# Patient Record
Sex: MALE | URBAN - METROPOLITAN AREA
[De-identification: names, ages, dates, MRNs, and addresses within clinical notes are randomized per-mention and may not be internally consistent; named-entity substitution may affect disease eponyms.]

---

## 2020-09-28 ENCOUNTER — HOSPITAL ENCOUNTER (OUTPATIENT)
Dept: RADIOLOGY | Facility: MEDICAL CENTER | Age: 30
End: 2020-09-28
Attending: PHYSICAL MEDICINE & REHABILITATION
Payer: COMMERCIAL

## 2020-09-28 DIAGNOSIS — M54.50 LOW BACK PAIN, UNSPECIFIED BACK PAIN LATERALITY, UNSPECIFIED CHRONICITY, UNSPECIFIED WHETHER SCIATICA PRESENT: ICD-10-CM

## 2020-09-28 PROCEDURE — 72148 MRI LUMBAR SPINE W/O DYE: CPT

## 2024-09-11 ENCOUNTER — HOSPITAL ENCOUNTER (INPATIENT)
Facility: MEDICAL CENTER | Age: 34
LOS: 2 days | DRG: 193 | End: 2024-09-13
Attending: INTERNAL MEDICINE | Admitting: STUDENT IN AN ORGANIZED HEALTH CARE EDUCATION/TRAINING PROGRAM
Payer: COMMERCIAL

## 2024-09-11 DIAGNOSIS — I27.20 PULMONARY HYPERTENSION (HCC): ICD-10-CM

## 2024-09-11 DIAGNOSIS — J18.9 MULTIFOCAL PNEUMONIA: ICD-10-CM

## 2024-09-11 PROBLEM — R79.89 ELEVATED BRAIN NATRIURETIC PEPTIDE (BNP) LEVEL: Status: ACTIVE | Noted: 2024-09-11

## 2024-09-11 PROBLEM — J96.01 ACUTE HYPOXIC RESPIRATORY FAILURE (HCC): Status: ACTIVE | Noted: 2024-09-11

## 2024-09-11 LAB — TROPONIN T SERPL-MCNC: 14 NG/L (ref 6–19)

## 2024-09-11 PROCEDURE — 84484 ASSAY OF TROPONIN QUANT: CPT

## 2024-09-11 PROCEDURE — 770000 HCHG ROOM/CARE - INTERMEDIATE ICU *

## 2024-09-11 PROCEDURE — 700111 HCHG RX REV CODE 636 W/ 250 OVERRIDE (IP): Mod: JZ | Performed by: STUDENT IN AN ORGANIZED HEALTH CARE EDUCATION/TRAINING PROGRAM

## 2024-09-11 PROCEDURE — 87449 NOS EACH ORGANISM AG IA: CPT

## 2024-09-11 PROCEDURE — 99223 1ST HOSP IP/OBS HIGH 75: CPT | Performed by: STUDENT IN AN ORGANIZED HEALTH CARE EDUCATION/TRAINING PROGRAM

## 2024-09-11 PROCEDURE — 87899 AGENT NOS ASSAY W/OPTIC: CPT

## 2024-09-11 RX ORDER — MORPHINE SULFATE 4 MG/ML
2 INJECTION INTRAVENOUS
Status: DISCONTINUED | OUTPATIENT
Start: 2024-09-11 | End: 2024-09-13 | Stop reason: HOSPADM

## 2024-09-11 RX ORDER — PROMETHAZINE HYDROCHLORIDE 12.5 MG/1
12.5-25 SUPPOSITORY RECTAL EVERY 4 HOURS PRN
Status: DISCONTINUED | OUTPATIENT
Start: 2024-09-11 | End: 2024-09-13 | Stop reason: HOSPADM

## 2024-09-11 RX ORDER — ENOXAPARIN SODIUM 100 MG/ML
40 INJECTION SUBCUTANEOUS DAILY
Status: DISCONTINUED | OUTPATIENT
Start: 2024-09-12 | End: 2024-09-12

## 2024-09-11 RX ORDER — ONDANSETRON 4 MG/1
4 TABLET, ORALLY DISINTEGRATING ORAL EVERY 4 HOURS PRN
Status: DISCONTINUED | OUTPATIENT
Start: 2024-09-11 | End: 2024-09-13 | Stop reason: HOSPADM

## 2024-09-11 RX ORDER — PROMETHAZINE HYDROCHLORIDE 25 MG/1
12.5-25 TABLET ORAL EVERY 4 HOURS PRN
Status: DISCONTINUED | OUTPATIENT
Start: 2024-09-11 | End: 2024-09-13 | Stop reason: HOSPADM

## 2024-09-11 RX ORDER — FUROSEMIDE 10 MG/ML
20 INJECTION INTRAMUSCULAR; INTRAVENOUS
Status: DISCONTINUED | OUTPATIENT
Start: 2024-09-11 | End: 2024-09-13 | Stop reason: HOSPADM

## 2024-09-11 RX ORDER — ONDANSETRON 2 MG/ML
4 INJECTION INTRAMUSCULAR; INTRAVENOUS EVERY 4 HOURS PRN
Status: DISCONTINUED | OUTPATIENT
Start: 2024-09-11 | End: 2024-09-13 | Stop reason: HOSPADM

## 2024-09-11 RX ORDER — AZITHROMYCIN 500 MG/5ML
500 INJECTION, POWDER, LYOPHILIZED, FOR SOLUTION INTRAVENOUS EVERY 24 HOURS
Status: DISCONTINUED | OUTPATIENT
Start: 2024-09-12 | End: 2024-09-12

## 2024-09-11 RX ORDER — OXYCODONE HYDROCHLORIDE 5 MG/1
2.5 TABLET ORAL
Status: DISCONTINUED | OUTPATIENT
Start: 2024-09-11 | End: 2024-09-13 | Stop reason: HOSPADM

## 2024-09-11 RX ORDER — IPRATROPIUM BROMIDE AND ALBUTEROL SULFATE 2.5; .5 MG/3ML; MG/3ML
3 SOLUTION RESPIRATORY (INHALATION)
Status: DISCONTINUED | OUTPATIENT
Start: 2024-09-11 | End: 2024-09-13 | Stop reason: HOSPADM

## 2024-09-11 RX ORDER — OXYCODONE HYDROCHLORIDE 5 MG/1
5 TABLET ORAL
Status: DISCONTINUED | OUTPATIENT
Start: 2024-09-11 | End: 2024-09-13 | Stop reason: HOSPADM

## 2024-09-11 RX ORDER — PROCHLORPERAZINE EDISYLATE 5 MG/ML
5-10 INJECTION INTRAMUSCULAR; INTRAVENOUS EVERY 4 HOURS PRN
Status: DISCONTINUED | OUTPATIENT
Start: 2024-09-11 | End: 2024-09-13 | Stop reason: HOSPADM

## 2024-09-11 RX ORDER — FUROSEMIDE 10 MG/ML
40 INJECTION INTRAMUSCULAR; INTRAVENOUS
Status: DISCONTINUED | OUTPATIENT
Start: 2024-09-11 | End: 2024-09-11

## 2024-09-11 RX ORDER — FUROSEMIDE 10 MG/ML
20 INJECTION INTRAMUSCULAR; INTRAVENOUS
Status: DISCONTINUED | OUTPATIENT
Start: 2024-09-11 | End: 2024-09-11

## 2024-09-11 RX ADMIN — FUROSEMIDE 20 MG: 10 INJECTION, SOLUTION INTRAVENOUS at 20:02

## 2024-09-11 ASSESSMENT — PATIENT HEALTH QUESTIONNAIRE - PHQ9
2. FEELING DOWN, DEPRESSED, IRRITABLE, OR HOPELESS: NOT AT ALL
1. LITTLE INTEREST OR PLEASURE IN DOING THINGS: NOT AT ALL
SUM OF ALL RESPONSES TO PHQ9 QUESTIONS 1 AND 2: 0

## 2024-09-11 ASSESSMENT — COGNITIVE AND FUNCTIONAL STATUS - GENERAL
SUGGESTED CMS G CODE MODIFIER DAILY ACTIVITY: CH
DAILY ACTIVITIY SCORE: 24
SUGGESTED CMS G CODE MODIFIER MOBILITY: CH
MOBILITY SCORE: 24

## 2024-09-11 ASSESSMENT — ENCOUNTER SYMPTOMS
COUGH: 1
CHILLS: 1
VOMITING: 1
FEVER: 1
SHORTNESS OF BREATH: 1
NAUSEA: 1
SPUTUM PRODUCTION: 1

## 2024-09-11 ASSESSMENT — PAIN DESCRIPTION - PAIN TYPE
TYPE: ACUTE PAIN
TYPE: ACUTE PAIN

## 2024-09-11 NOTE — PROGRESS NOTES
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  Sending physician reports that approximately 1 week ago he began to have nausea, vomiting, cough and dyspnea.  He was found to be saturating at 40% on room air by EMS and was brought to the ER.  In the ER he was placed on a nonrebreather and now has saturations in the low 90s.  A respiratory viral panel was obtained which was negative, he has a mild leukocytosis on CBC otherwise is not profoundly abnormal.  Other labs include a CRP of 2.4, troponin 39, .  CT showed multifocal pneumonia.  Given the patient's extremis due to transfer to [facility].  Reviewed occupational exposures with sending physician and he does not believe patient has had any exposure to vector borne illnesses or chemicals.  He is unaware of any mice droppings near his campsite.  Pertinent imaging & lab results: As above  Code Status: Full per transferring provider, I personally verified with the transferring provider patient's code status and the transferring provider has confirmed this with the patient.  Further work up or recommendations prior to transfer: None  Consultants called prior to transfer and pertinent input from consultants: None  Patient accepted for transfer: Yes  Consultants to be called upon arrival: Hospitalist  Floor requested: IMCU  ADT order placed for accepted patient: Yes    Please inform the Intensivist upon assignment of an ICU bed and then again on arrival of the patient.

## 2024-09-12 PROBLEM — J96.01 ACUTE RESPIRATORY FAILURE WITH HYPOXEMIA (HCC): Status: ACTIVE | Noted: 2024-09-12

## 2024-09-12 LAB
ALBUMIN SERPL BCP-MCNC: 3.3 G/DL (ref 3.2–4.9)
ALBUMIN/GLOB SERPL: 1.3 G/DL
ALP SERPL-CCNC: 72 U/L (ref 30–99)
ALT SERPL-CCNC: 64 U/L (ref 2–50)
ANION GAP SERPL CALC-SCNC: 10 MMOL/L (ref 7–16)
AST SERPL-CCNC: 33 U/L (ref 12–45)
BASOPHILS # BLD AUTO: 0.4 % (ref 0–1.8)
BASOPHILS # BLD: 0.05 K/UL (ref 0–0.12)
BILIRUB SERPL-MCNC: 1 MG/DL (ref 0.1–1.5)
BUN SERPL-MCNC: 20 MG/DL (ref 8–22)
CALCIUM ALBUM COR SERPL-MCNC: 8.6 MG/DL (ref 8.5–10.5)
CALCIUM SERPL-MCNC: 8 MG/DL (ref 8.5–10.5)
CHLORIDE SERPL-SCNC: 106 MMOL/L (ref 96–112)
CO2 SERPL-SCNC: 25 MMOL/L (ref 20–33)
CREAT SERPL-MCNC: 1.35 MG/DL (ref 0.5–1.4)
EOSINOPHIL # BLD AUTO: 0.05 K/UL (ref 0–0.51)
EOSINOPHIL NFR BLD: 0.4 % (ref 0–6.9)
ERYTHROCYTE [DISTWIDTH] IN BLOOD BY AUTOMATED COUNT: 41.8 FL (ref 35.9–50)
GFR SERPLBLD CREATININE-BSD FMLA CKD-EPI: 71 ML/MIN/1.73 M 2
GLOBULIN SER CALC-MCNC: 2.5 G/DL (ref 1.9–3.5)
GLUCOSE SERPL-MCNC: 128 MG/DL (ref 65–99)
HCT VFR BLD AUTO: 44.3 % (ref 42–52)
HGB BLD-MCNC: 14.7 G/DL (ref 14–18)
IMM GRANULOCYTES # BLD AUTO: 0.04 K/UL (ref 0–0.11)
IMM GRANULOCYTES NFR BLD AUTO: 0.3 % (ref 0–0.9)
LYMPHOCYTES # BLD AUTO: 1.88 K/UL (ref 1–4.8)
LYMPHOCYTES NFR BLD: 15 % (ref 22–41)
MAGNESIUM SERPL-MCNC: 2 MG/DL (ref 1.5–2.5)
MCH RBC QN AUTO: 30.2 PG (ref 27–33)
MCHC RBC AUTO-ENTMCNC: 33.2 G/DL (ref 32.3–36.5)
MCV RBC AUTO: 91 FL (ref 81.4–97.8)
MONOCYTES # BLD AUTO: 1.27 K/UL (ref 0–0.85)
MONOCYTES NFR BLD AUTO: 10.1 % (ref 0–13.4)
NEUTROPHILS # BLD AUTO: 9.25 K/UL (ref 1.82–7.42)
NEUTROPHILS NFR BLD: 73.8 % (ref 44–72)
NRBC # BLD AUTO: 0 K/UL
NRBC BLD-RTO: 0 /100 WBC (ref 0–0.2)
PLATELET # BLD AUTO: 223 K/UL (ref 164–446)
PMV BLD AUTO: 10.1 FL (ref 9–12.9)
POTASSIUM SERPL-SCNC: 4.3 MMOL/L (ref 3.6–5.5)
PROT SERPL-MCNC: 5.8 G/DL (ref 6–8.2)
RBC # BLD AUTO: 4.87 M/UL (ref 4.7–6.1)
SODIUM SERPL-SCNC: 141 MMOL/L (ref 135–145)
WBC # BLD AUTO: 12.5 K/UL (ref 4.8–10.8)

## 2024-09-12 PROCEDURE — 80053 COMPREHEN METABOLIC PANEL: CPT

## 2024-09-12 PROCEDURE — 83735 ASSAY OF MAGNESIUM: CPT

## 2024-09-12 PROCEDURE — 770006 HCHG ROOM/CARE - MED/SURG/GYN SEMI*

## 2024-09-12 PROCEDURE — 700111 HCHG RX REV CODE 636 W/ 250 OVERRIDE (IP): Performed by: STUDENT IN AN ORGANIZED HEALTH CARE EDUCATION/TRAINING PROGRAM

## 2024-09-12 PROCEDURE — 87040 BLOOD CULTURE FOR BACTERIA: CPT | Mod: 91

## 2024-09-12 PROCEDURE — 87077 CULTURE AEROBIC IDENTIFY: CPT

## 2024-09-12 PROCEDURE — 36415 COLL VENOUS BLD VENIPUNCTURE: CPT

## 2024-09-12 PROCEDURE — 85025 COMPLETE CBC W/AUTO DIFF WBC: CPT

## 2024-09-12 PROCEDURE — 99233 SBSQ HOSP IP/OBS HIGH 50: CPT | Performed by: HOSPITALIST

## 2024-09-12 PROCEDURE — 87150 DNA/RNA AMPLIFIED PROBE: CPT

## 2024-09-12 PROCEDURE — 700102 HCHG RX REV CODE 250 W/ 637 OVERRIDE(OP): Performed by: HOSPITALIST

## 2024-09-12 PROCEDURE — A9270 NON-COVERED ITEM OR SERVICE: HCPCS | Performed by: HOSPITALIST

## 2024-09-12 RX ORDER — AZITHROMYCIN 250 MG/1
500 TABLET, FILM COATED ORAL DAILY
Status: COMPLETED | OUTPATIENT
Start: 2024-09-12 | End: 2024-09-13

## 2024-09-12 RX ADMIN — FUROSEMIDE 20 MG: 10 INJECTION, SOLUTION INTRAVENOUS at 16:23

## 2024-09-12 RX ADMIN — FUROSEMIDE 20 MG: 10 INJECTION, SOLUTION INTRAVENOUS at 05:58

## 2024-09-12 RX ADMIN — AMOXICILLIN AND CLAVULANATE POTASSIUM 1 TABLET: 875; 125 TABLET, FILM COATED ORAL at 11:30

## 2024-09-12 RX ADMIN — AZITHROMYCIN DIHYDRATE 500 MG: 250 TABLET ORAL at 09:37

## 2024-09-12 RX ADMIN — AMOXICILLIN AND CLAVULANATE POTASSIUM 1 TABLET: 875; 125 TABLET, FILM COATED ORAL at 16:23

## 2024-09-12 ASSESSMENT — ENCOUNTER SYMPTOMS
VOMITING: 0
DIARRHEA: 0
COUGH: 0
SHORTNESS OF BREATH: 1
HEADACHES: 0
SORE THROAT: 0
NAUSEA: 0
FEVER: 0
ABDOMINAL PAIN: 0
NERVOUS/ANXIOUS: 0
DIZZINESS: 0

## 2024-09-12 ASSESSMENT — FIBROSIS 4 INDEX: FIB4 SCORE: 0.61

## 2024-09-12 ASSESSMENT — PAIN DESCRIPTION - PAIN TYPE
TYPE: ACUTE PAIN

## 2024-09-12 NOTE — PROGRESS NOTES
Pharmacy Medication Reconciliation      ~Medication reconciliation updated and complete per patient at bedside  ~Allergies have been verified and updated   ~No oral ABX within the last 30 days  ~Patient home pharmacy :  [facility] [phone]      ~Anticoagulants (rivaroxaban, apixaban, edoxaban, dabigatran, warfarin, enoxaparin) taken in the last 14 days? No  ~

## 2024-09-12 NOTE — PROGRESS NOTES
Patient arrived to [facility] rm T605 via [facility].  Pt is A&O x4, 98% on 10L NRB, SR in 60s. No c/o pain.  Pain able to stand and pivot, denies dizziness/lightheadedness.  Skin intact.  Oriented patient to the room, safety precautions in place.  No voiced needs at this time.

## 2024-09-12 NOTE — ASSESSMENT & PLAN NOTE
Normal procalcitonin  Viral PCR, covid negative  Elevated wbc but question leukemoid reaction  Changing antibiotics to augmentin and stop ceftriaxone  Encourage deep breathing  Titrating oxygen to keep SpO2 >90  Suspicious for aspiration pneumonia  Elevated BNP >800 and checking echocardiogram. Negative troponin  Mobilize  CTA chest negative for PE and showed mostly right sided opacifications with some on left  RT per protocol  High altitude sickness as to etiology of N/V??  He has been used to higher altitudes in the past but had just competed in [place] for a fitness event.

## 2024-09-12 NOTE — CARE PLAN
The patient is Watcher - Medium risk of patient condition declining or worsening    Shift Goals  Clinical Goals: Titrae oxygen, Pulmonology consult  Patient Goals: Go home soon  Family Goals: updates    Progress made toward(s) clinical / shift goals:      Problem: Knowledge Deficit - Standard  Goal: Patient and family/care givers will demonstrate understanding of plan of care, disease process/condition, diagnostic tests and medications  Outcome: Progressing     Problem: Pain - Standard  Goal: Alleviation of pain or a reduction in pain to the patient’s comfort goal  Outcome: Progressing     Problem: Psychosocial  Goal: Patient's level of anxiety will decrease  Outcome: Progressing     Problem: Hemodynamics  Goal: Patient's hemodynamics, fluid balance and neurologic status will be stable or improve  Outcome: Progressing     Problem: Respiratory  Goal: Patient will achieve/maintain optimum respiratory ventilation and gas exchange  Outcome: Progressing

## 2024-09-12 NOTE — CARE PLAN
The patient is Stable - Low risk of patient condition declining or worsening    Shift Goals  Clinical Goals: wean off oxygen, ECHO  Patient Goals: go home  Family Goals: updates    Progress made toward(s) clinical / shift goals:    Problem: Knowledge Deficit - Standard  Goal: Patient and family/care givers will demonstrate understanding of plan of care, disease process/condition, diagnostic tests and medications  Outcome: Progressing     Problem: Respiratory  Goal: Patient will achieve/maintain optimum respiratory ventilation and gas exchange  Outcome: Progressing     Pt was transferred from [facility] to [place] [address] today. PT was weaned from 6 L N/C to 1 L N/C with education on incentive spirometer.     Patient is not progressing towards the following goals:

## 2024-09-12 NOTE — H&P
Hospital Medicine History & Physical Note    Date of Service  9/11/2024    Primary Care Physician  Pcp Pt States None    Consultants  None     Code Status  Full Code    Chief Complaint  Respiratory distress       History of Presenting Illness  Jamin Fagan is a 34 y.o. male who presented 9/11/2024 with nausea, vomiting and shortness of breath.  Patient was visiting [place] performed a CrossFit competition from Wednesday to over the weekend then subsequently went in the mountains to cut wood for ski slopes.  He started feeling unwell few days he was there slept it off however this morning woke up persistently nauseous and vomiting when he laid down.  Reports having headaches, fevers, chills.  Denies any insect bite that he is aware of.  Denies any sick contacts.  At outside facility, CT of the chest was negative for PE but did showed multifocal pneumonia.  He was given dose of ceftriaxone and Zithromax.    I discussed the plan of care with patient and bedside RN.    Review of Systems  Review of Systems   Constitutional:  Positive for chills, fever and malaise/fatigue.   Respiratory:  Positive for cough, sputum production and shortness of breath.    Gastrointestinal:  Positive for nausea and vomiting.       Past Medical History   has no past medical history on file.    Surgical History   has no past surgical history on file.     Family History  family history is not on file.   Family history reviewed with patient. There is no family history that is pertinent to the chief complaint.     Social History   reports that he has never smoked. He has never used smokeless tobacco. He reports current alcohol use of about 3.0 oz of alcohol per week.    Allergies  No Known Allergies    Medications  None       Physical Exam                             Physical Exam  Vitals and nursing note reviewed.   Constitutional:       Appearance: Normal appearance.   HENT:      Head: Normocephalic and atraumatic.      Right Ear:  Tympanic membrane normal.      Left Ear: Tympanic membrane normal.      Nose: Nose normal.      Mouth/Throat:      Mouth: Mucous membranes are moist.      Pharynx: Oropharynx is clear.   Eyes:      Extraocular Movements: Extraocular movements intact.      Pupils: Pupils are equal, round, and reactive to light.   Cardiovascular:      Rate and Rhythm: Normal rate and regular rhythm.      Pulses: Normal pulses.      Heart sounds: Normal heart sounds.   Pulmonary:      Breath sounds: Rhonchi and rales present.      Comments: Tachypnea  Diminished air movement bilaterally   Abdominal:      General: Bowel sounds are normal. There is no distension.      Palpations: Abdomen is soft. There is no mass.   Musculoskeletal:         General: Normal range of motion.      Cervical back: Neck supple.   Skin:     General: Skin is warm.      Capillary Refill: Capillary refill takes less than 2 seconds.   Neurological:      General: No focal deficit present.      Mental Status: He is alert and oriented to person, place, and time. Mental status is at baseline.   Psychiatric:         Mood and Affect: Mood normal.         Behavior: Behavior normal.         Laboratory:        WBC 14.5  Hemoglobin 15.7  Platelets 266  Sodium 142  Potassium 4.2  Glucose 128  BUN 25  Creatinine 1.3  AST 52  Total bili 1.1    Lactic acid 1.7  Troponin high at 39    Respiratory panel negative for coronavirus, SARS COVID, influenza, parainfluenza, RSV, Bordetella, chlamydia, mycoplasma  CRP 2.4  Procalcitonin less than 0.05 ESR 4  Prothrombin 11.2  PTT 22.1    Imaging:  OUTSIDE IMAGES-CT ABDOMEN /PELVIS   Final Result      OUTSIDE IMAGES-CT CHEST    (Results Pending)       X-Ray:  I have personally reviewed the images and compared with prior images.    Assessment/Plan:  Justification for Admission Status  I anticipate this patient will require at least two midnights for appropriate medical management, necessitating inpatient admission because acute  hypoxic respiratory failure secondary to multifocal pneumonia versus ARDS    Patient will need a Intermediate Care (Adult and Pediatrics) bed on MEDICAL service .  The need is secondary to see above.    * Acute hypoxic respiratory failure (HCC)  Assessment & Plan  ?ARDS vs Multi focal PNA  Continue with ceftriaxone and Zithromax  Follow-up blood, respiratory, strep pneumo urine antigen and Legionella urine antigens  Viral panel expanded negative at outside facility  ?  Diuresis  Echocardiogram  Pulmonary consultation in a.m.  Bronchodilators as needed  Holding steroids possible fungal in nature as per intensivist discussion with outside provider    Multifocal pneumonia  Assessment & Plan  Continue with ceftriaxone and Zithromax  Follow-up blood, respiratory, strep pneumo urine antigen and Legionella urine antigens  Viral panel expanded negative at outside facility    Elevated brain natriuretic peptide (BNP) level  Assessment & Plan  Echocardiogram        VTE prophylaxis: SCDs/TEDs

## 2024-09-12 NOTE — ASSESSMENT & PLAN NOTE
Change ceftriaxone and Zithromax to oral Augmentin 9/11/24  Follow-up blood, respiratory, strep pneumo urine antigen and Legionella urine antigens  Viral panel expanded negative at outside facility as was procalcitonin

## 2024-09-12 NOTE — PROGRESS NOTES
Hospital Medicine Daily Progress Note    Date of Service  9/12/2024    Chief Complaint  Respiratory distress    Hospital Course  Jamin Fagan is a quite active/fit 34 y.o. male admitted 9/11/2024 with nausea vomiting shortness of breath.  Who had been camping and working at [place] the [employer] recently. He denies rodent dropping exposure. He states feeling nauseated, headache, and vomiting. He believes it is possible he aspirated some emesis. He went to an outside facility and there is concerns of respiratory distress of unclear etiology and he was transferred to [facility] for further evaluation.  Imaging at outside facility was concerning for multifocal pneumonia versus ARDS.  Patient had been started on ceftriaxone and azithromycin.  Reported expanded viral panel at outside facility was unremarkable as was COVID. Outpatient labs showed normal procalcitonin, negative viral PCR.  He had a BNP ~850.      Interval Problem Update  9/12 WBC 12.5, on 2L O2 via NC.  No significant cough, no fever.  Reviewed CT chest and abd with him.  Transfer out of ICU.  Ambulate.  Patient did states aspirating with vomiting prior to hospitalization.  Feeling better.  Await echocardiogram.    I have discussed this patient's plan of care and discharge plan at IDT rounds today with Case Management, Nursing, Nursing leadership, and other members of the IDT team.    Consultants/Specialty  none    Code Status  Full Code    Disposition  The patient is not medically cleared for discharge to home or a post-acute facility.  Anticipate discharge to: home with close outpatient follow-up    I have placed the appropriate orders for post-discharge needs.    Review of Systems  Review of Systems   Constitutional:  Negative for fever and malaise/fatigue.   HENT:  Negative for congestion and sore throat.    Respiratory:  Positive for shortness of breath. Negative for cough.    Cardiovascular:  Negative for chest pain.    Gastrointestinal:  Negative for abdominal pain, diarrhea, nausea and vomiting.   Neurological:  Negative for dizziness and headaches.   Psychiatric/Behavioral:  The patient is not nervous/anxious.         Physical Exam  Temp:  [36.8 °C (98.2 °F)-37.4 °C (99.4 °F)] 36.8 °C (98.2 °F)  Pulse:  [46-71] 60  Resp:  [22-53] 53  BP: (108-167)/(57-84) 116/71  SpO2:  [91 %-98 %] 91 %    Physical Exam  Vitals reviewed.   Constitutional:       Appearance: He is not ill-appearing.   HENT:      Head: Normocephalic.      Mouth/Throat:      Mouth: Mucous membranes are moist.   Eyes:      Extraocular Movements: Extraocular movements intact.      Conjunctiva/sclera: Conjunctivae normal.   Cardiovascular:      Rate and Rhythm: Normal rate and regular rhythm.      Heart sounds: No murmur heard.  Pulmonary:      Effort: Pulmonary effort is normal.      Comments: Right lower base crackles  Abdominal:      General: Bowel sounds are normal. There is no distension.   Musculoskeletal:      Cervical back: Neck supple. No tenderness.      Right lower leg: No edema.      Left lower leg: No edema.   Lymphadenopathy:      Cervical: No cervical adenopathy.   Neurological:      General: No focal deficit present.      Mental Status: He is alert and oriented to person, place, and time.      Cranial Nerves: No cranial nerve deficit.   Psychiatric:         Mood and Affect: Mood normal.         Behavior: Behavior normal.         Thought Content: Thought content normal.         Fluids    Intake/Output Summary (Last 24 hours) at 9/12/2024 1154  Last data filed at 9/12/2024 1000  Gross per 24 hour   Intake 650 ml   Output 2800 ml   Net -2150 ml        Laboratory  Recent Labs     09/12/24  0500   WBC 12.5*   RBC 4.87   HEMOGLOBIN 14.7   HEMATOCRIT 44.3   MCV 91.0   MCH 30.2   MCHC 33.2   RDW 41.8   PLATELETCT 223   MPV 10.1     Recent Labs     09/12/24  0500   SODIUM 141   POTASSIUM 4.3   CHLORIDE 106   CO2 25   GLUCOSE 128*   BUN 20   CREATININE 1.35    CALCIUM 8.0*                   Imaging  OUTSIDE IMAGES-CT ABDOMEN /PELVIS   Final Result      OUTSIDE IMAGES-CT CHEST    (Results Pending)   EC-ECHOCARDIOGRAM COMPLETE W/O CONT    (Results Pending)        Assessment/Plan  Acute respiratory failure with hypoxemia (HCC)- (present on admission)  Assessment & Plan  Normal procalcitonin  Viral PCR, covid negative  Elevated wbc but question leukemoid reaction  Changing antibiotics to augmentin and stop ceftriaxone  Encourage deep breathing  Titrating oxygen to keep SpO2 >90  Suspicious for aspiration pneumonia  Elevated BNP >800 and checking echocardiogram. Negative troponin  Mobilize  CTA chest negative for PE and showed mostly right sided opacifications with some on left  RT per protocol  High altitude sickness as to etiology of N/V??  He has been used to higher altitudes in the past but had just competed in [place] at [employer] [place] for a fitness event.        Elevated brain natriuretic peptide (BNP) level  Assessment & Plan  Echocardiogram pending  No acute sign of volume overload    Multifocal pneumonia  Assessment & Plan  Change ceftriaxone and Zithromax to oral Augmentin 9/11/24  Follow-up blood, respiratory, strep pneumo urine antigen and Legionella urine antigens  Viral panel expanded negative at outside facility as was procalcitonin    Acute hypoxic respiratory failure (HCC)  Assessment & Plan             VTE prophylaxis:   SCDs/TEDs   enoxaparin ppx      I have performed a physical exam and reviewed and updated ROS and Plan today (9/12/2024). In review of yesterday's note (9/11/2024), there are no changes except as documented above.

## 2024-09-12 NOTE — PROGRESS NOTES
4 Eyes Skin Assessment Completed by KEIRA Zimmerman and KEIRA Vidal.    Head WDL  Ears WDL  Nose WDL  Mouth WDL  Neck WDL  Breast/Chest WDL  Shoulder Blades WDL  Spine WDL  (R) Arm/Elbow/Hand WDL  (L) Arm/Elbow/Hand WDL  Abdomen WDL  Groin WDL  Scrotum/Coccyx/Buttocks WDL  (R) Leg Scab  (L) Leg Scab  (R) Heel/Foot/Toe WDL  (L) Heel/Foot/Toe WDL          Devices In Places Nasal Canula       Interventions In Place Pillows, Grey foam on N/C tubing     Possible Skin Injury No    Pictures Uploaded Into Epic N/A  Wound Consult Placed N/A  RN Wound Prevention Protocol Ordered No

## 2024-09-13 ENCOUNTER — APPOINTMENT (OUTPATIENT)
Dept: CARDIOLOGY | Facility: MEDICAL CENTER | Age: 34
DRG: 193 | End: 2024-09-13
Attending: INTERNAL MEDICINE
Payer: COMMERCIAL

## 2024-09-13 ENCOUNTER — PHARMACY VISIT (OUTPATIENT)
Dept: PHARMACY | Facility: MEDICAL CENTER | Age: 34
End: 2024-09-13
Payer: COMMERCIAL

## 2024-09-13 VITALS
HEIGHT: 69 IN | RESPIRATION RATE: 17 BRPM | OXYGEN SATURATION: 95 % | WEIGHT: 184.53 LBS | SYSTOLIC BLOOD PRESSURE: 134 MMHG | DIASTOLIC BLOOD PRESSURE: 58 MMHG | TEMPERATURE: 98.4 F | BODY MASS INDEX: 27.33 KG/M2 | HEART RATE: 63 BPM

## 2024-09-13 LAB
ALBUMIN SERPL BCP-MCNC: 3.4 G/DL (ref 3.2–4.9)
ALBUMIN/GLOB SERPL: 1.2 G/DL
ALP SERPL-CCNC: 70 U/L (ref 30–99)
ALT SERPL-CCNC: 54 U/L (ref 2–50)
AMPHET UR QL SCN: NEGATIVE
ANION GAP SERPL CALC-SCNC: 10 MMOL/L (ref 7–16)
AST SERPL-CCNC: 26 U/L (ref 12–45)
BARBITURATES UR QL SCN: NEGATIVE
BASOPHILS # BLD AUTO: 0.5 % (ref 0–1.8)
BASOPHILS # BLD: 0.05 K/UL (ref 0–0.12)
BENZODIAZ UR QL SCN: NEGATIVE
BILIRUB SERPL-MCNC: 0.7 MG/DL (ref 0.1–1.5)
BUN SERPL-MCNC: 22 MG/DL (ref 8–22)
BZE UR QL SCN: NEGATIVE
CALCIUM ALBUM COR SERPL-MCNC: 9.2 MG/DL (ref 8.5–10.5)
CALCIUM SERPL-MCNC: 8.7 MG/DL (ref 8.5–10.5)
CANNABINOIDS UR QL SCN: NEGATIVE
CHLORIDE SERPL-SCNC: 103 MMOL/L (ref 96–112)
CO2 SERPL-SCNC: 26 MMOL/L (ref 20–33)
CREAT SERPL-MCNC: 1.29 MG/DL (ref 0.5–1.4)
EOSINOPHIL # BLD AUTO: 0.18 K/UL (ref 0–0.51)
EOSINOPHIL NFR BLD: 1.8 % (ref 0–6.9)
ERYTHROCYTE [DISTWIDTH] IN BLOOD BY AUTOMATED COUNT: 41.1 FL (ref 35.9–50)
FENTANYL UR QL: NEGATIVE
GFR SERPLBLD CREATININE-BSD FMLA CKD-EPI: 75 ML/MIN/1.73 M 2
GLOBULIN SER CALC-MCNC: 2.8 G/DL (ref 1.9–3.5)
GLUCOSE SERPL-MCNC: 108 MG/DL (ref 65–99)
HAV IGM SERPL QL IA: NORMAL
HBV CORE IGM SER QL: NORMAL
HBV SURFACE AG SER QL: NORMAL
HCT VFR BLD AUTO: 44.9 % (ref 42–52)
HCV AB SER QL: NORMAL
HGB BLD-MCNC: 15.3 G/DL (ref 14–18)
HIV 1+2 AB+HIV1 P24 AG SERPL QL IA: NORMAL
IMM GRANULOCYTES # BLD AUTO: 0.03 K/UL (ref 0–0.11)
IMM GRANULOCYTES NFR BLD AUTO: 0.3 % (ref 0–0.9)
L PNEUMO AG UR QL IA: NEGATIVE
LV EJECT FRACT  99904: 64
LV EJECT FRACT MOD 2C 99903: 63.73
LV EJECT FRACT MOD 4C 99902: 66.69
LV EJECT FRACT MOD BP 99901: 64.5
LYMPHOCYTES # BLD AUTO: 2.32 K/UL (ref 1–4.8)
LYMPHOCYTES NFR BLD: 23.7 % (ref 22–41)
MAGNESIUM SERPL-MCNC: 2.1 MG/DL (ref 1.5–2.5)
MCH RBC QN AUTO: 30.9 PG (ref 27–33)
MCHC RBC AUTO-ENTMCNC: 34.1 G/DL (ref 32.3–36.5)
MCV RBC AUTO: 90.7 FL (ref 81.4–97.8)
METHADONE UR QL SCN: NEGATIVE
MONOCYTES # BLD AUTO: 1.04 K/UL (ref 0–0.85)
MONOCYTES NFR BLD AUTO: 10.6 % (ref 0–13.4)
NEUTROPHILS # BLD AUTO: 6.16 K/UL (ref 1.82–7.42)
NEUTROPHILS NFR BLD: 63.1 % (ref 44–72)
NRBC # BLD AUTO: 0 K/UL
NRBC BLD-RTO: 0 /100 WBC (ref 0–0.2)
OPIATES UR QL SCN: NEGATIVE
OXYCODONE UR QL SCN: NEGATIVE
PCP UR QL SCN: NEGATIVE
PLATELET # BLD AUTO: 251 K/UL (ref 164–446)
PMV BLD AUTO: 10.5 FL (ref 9–12.9)
POTASSIUM SERPL-SCNC: 4 MMOL/L (ref 3.6–5.5)
PROPOXYPH UR QL SCN: NEGATIVE
PROT SERPL-MCNC: 6.2 G/DL (ref 6–8.2)
RBC # BLD AUTO: 4.95 M/UL (ref 4.7–6.1)
S PNEUM AG UR QL: NEGATIVE
SODIUM SERPL-SCNC: 139 MMOL/L (ref 135–145)
WBC # BLD AUTO: 9.8 K/UL (ref 4.8–10.8)

## 2024-09-13 PROCEDURE — 700102 HCHG RX REV CODE 250 W/ 637 OVERRIDE(OP): Performed by: HOSPITALIST

## 2024-09-13 PROCEDURE — 87389 HIV-1 AG W/HIV-1&-2 AB AG IA: CPT

## 2024-09-13 PROCEDURE — 93306 TTE W/DOPPLER COMPLETE: CPT | Mod: 26 | Performed by: INTERNAL MEDICINE

## 2024-09-13 PROCEDURE — A9270 NON-COVERED ITEM OR SERVICE: HCPCS | Performed by: HOSPITALIST

## 2024-09-13 PROCEDURE — RXMED WILLOW AMBULATORY MEDICATION CHARGE: Performed by: INTERNAL MEDICINE

## 2024-09-13 PROCEDURE — 93306 TTE W/DOPPLER COMPLETE: CPT

## 2024-09-13 PROCEDURE — 83735 ASSAY OF MAGNESIUM: CPT

## 2024-09-13 PROCEDURE — 80307 DRUG TEST PRSMV CHEM ANLYZR: CPT

## 2024-09-13 PROCEDURE — 700111 HCHG RX REV CODE 636 W/ 250 OVERRIDE (IP): Performed by: STUDENT IN AN ORGANIZED HEALTH CARE EDUCATION/TRAINING PROGRAM

## 2024-09-13 PROCEDURE — 80053 COMPREHEN METABOLIC PANEL: CPT

## 2024-09-13 PROCEDURE — 85025 COMPLETE CBC W/AUTO DIFF WBC: CPT

## 2024-09-13 PROCEDURE — 80074 ACUTE HEPATITIS PANEL: CPT

## 2024-09-13 PROCEDURE — 99239 HOSP IP/OBS DSCHRG MGMT >30: CPT | Performed by: INTERNAL MEDICINE

## 2024-09-13 RX ADMIN — AMOXICILLIN AND CLAVULANATE POTASSIUM 1 TABLET: 875; 125 TABLET, FILM COATED ORAL at 04:18

## 2024-09-13 RX ADMIN — AMOXICILLIN AND CLAVULANATE POTASSIUM 1 TABLET: 875; 125 TABLET, FILM COATED ORAL at 17:31

## 2024-09-13 RX ADMIN — AZITHROMYCIN DIHYDRATE 500 MG: 250 TABLET ORAL at 04:18

## 2024-09-13 RX ADMIN — FUROSEMIDE 20 MG: 10 INJECTION, SOLUTION INTRAVENOUS at 17:31

## 2024-09-13 RX ADMIN — FUROSEMIDE 20 MG: 10 INJECTION, SOLUTION INTRAVENOUS at 04:19

## 2024-09-13 ASSESSMENT — PAIN DESCRIPTION - PAIN TYPE: TYPE: ACUTE PAIN

## 2024-09-13 NOTE — CARE PLAN
The patient is Stable - Low risk of patient condition declining or worsening    Shift Goals  Clinical Goals: wean off O2, monitor for SOB  Patient Goals: rest, discharge  Family Goals: AYLIN    Progress made toward(s) clinical / shift goals:  Pt alert and oriented. Denies pain. Incentive spirometer use encouraged. Oxygen being titrated down. Denies shortness of breath. Other needs met at this time.  Problem: Knowledge Deficit - Standard  Goal: Patient and family/care givers will demonstrate understanding of plan of care, disease process/condition, diagnostic tests and medications  Outcome: Progressing     Problem: Pain - Standard  Goal: Alleviation of pain or a reduction in pain to the patient’s comfort goal  Outcome: Progressing     Problem: Respiratory  Goal: Patient will achieve/maintain optimum respiratory ventilation and gas exchange  Outcome: Progressing       Patient is not progressing towards the following goals:

## 2024-09-13 NOTE — CONSULTS
"Pulmonary Consultation    Date of Service  9/13/2024    Referring Physician  Luba Carvalho D.O.    Consulting Physician  Vignesh Morejon D.O.    Reason for Consultation  Abnormal ECHO    History of Presenting Illness  From H&P: \"Jamin Fagan is a 34 y.o. male who presented 9/11/2024 with nausea, vomiting and shortness of breath.  Patient was visiting [place] performed a CrossFit competition from Wednesday to over the weekend then subsequently went [place] the mountains to cut wood [place] ski slopes.  He started feeling unwell few days he was there slept it off however this morning woke up persistently nauseous and vomiting when he laid down.  Reports having headaches, fevers, chills.  Denies any insect bite that he is aware of.  Denies any sick contacts.  At outside facility, CT of the chest was negative for PE but did showed multifocal pneumonia.  He was given dose of ceftriaxone and Zithromax.\"    Pulmonary was asked to review due to abnormal ECHO suggesting possible pulmonary HTN.  Patient was discharged before I could personally examine or interview him. He reportedly denied to Dr. Carvalho that he uses any supplements, performance enhancers, or recreational drugs.       Past Medical History   has no past medical history on file.    Surgical History   has no past surgical history on file.    Family History  family history is not on file.    Social History   reports that he has never smoked. He has never used smokeless tobacco. He reports current alcohol use of about 3.0 oz of alcohol per week.    Medications  None       Allergies  No Known Allergies    Physical Exam  Temp:  [36.4 °C (97.6 °F)-37.1 °C (98.8 °F)] 36.6 °C (97.8 °F)  Pulse:  [52-55] 52  Resp:  [17-18] 18  BP: (116-144)/(67-78) 129/71  SpO2:  [91 %-98 %] 92 %    Fluids  Date 09/13/24 0700 - 09/14/24 0659   Shift 4903-1550 1557-0387 2395-3642 24 Hour Total   INTAKE   P.O. 240   240   Shift Total 240   240   OUTPUT   Shift Total       Weight " (kg) 83.7 83.7 83.7 83.7       Laboratory  Recent Labs     09/12/24  0500 09/13/24  0303   WBC 12.5* 9.8   RBC 4.87 4.95   HEMOGLOBIN 14.7 15.3   HEMATOCRIT 44.3 44.9   MCV 91.0 90.7   MCH 30.2 30.9   MCHC 33.2 34.1   RDW 41.8 41.1   PLATELETCT 223 251   MPV 10.1 10.5     Recent Labs     09/12/24  0500 09/13/24  0303   SODIUM 141 139   POTASSIUM 4.3 4.0   CHLORIDE 106 103   CO2 25 26   GLUCOSE 128* 108*   BUN 20 22   CREATININE 1.35 1.29   CALCIUM 8.0* 8.7                     Imaging  EC-ECHOCARDIOGRAM COMPLETE W/O CONT   Final Result      OUTSIDE IMAGES-CT ABDOMEN /PELVIS   Final Result      OUTSIDE IMAGES-CT CHEST    (Results Pending)       Dilated pulmonary artery    Assessment/Plan  Abnormal echocardiogram  Assessment & Plan  Suggestive but not diagnostic of pulmonary HTN  Pulmonary artery also dilated on CT   Recommendations: HIV, Hepatitis labs, UDS - all negative  Recommend outpatient RHC  If true pulmonary HTN on RHC, will need extensive workup for etiology including sleep study      Vignesh Morejon, DO  Staff Pulmonologist and Intensivist  [facility]     Please note that this dictation was created using voice recognition software. The accuracy of the dictation is limited to the abilities of the software. I have made every reasonable attempt to correct obvious errors, but I expect that there are errors of grammar and possibly content that I did not discover before finalizing the note.

## 2024-09-13 NOTE — DISCHARGE SUMMARY
Discharge Summary    CHIEF COMPLAINT ON ADMISSION  No chief complaint on file.      Reason for Admission  ARDS     Admission Date  9/11/2024    CODE STATUS  Full Code    HPI & HOSPITAL COURSE  This is a 34 y.o. male here with dyspnea.      Jamin Fagan is a quite active/fit 34 y.o. male admitted 9/11/2024 with nausea vomiting shortness of breath.  Who had been camping and working at ~[employer]' the [employer] recently. He states feeling nauseated, headache, and vomiting. He believes it is possible he aspirated some emesis. He went to an outside facility and there is concerns of respiratory distress of unclear etiology and he was transferred to [facility] for further evaluation.  Imaging at outside facility was concerning for multifocal pneumonia versus ARDS.  Patient had been started on ceftriaxone and azithromycin.  Reported expanded viral panel at outside facility was unremarkable as was COVID. Outpatient labs showed normal procalcitonin, negative viral PCR.  He had a BNP ~850. Patient was started on IV diuresis. He has since been weaned to room air. Echo showed preserved EF and RVSP 59 mmHg. Discussed with pulmonary who ordered HIV and Hep C and recommended cardiology consult. Discussed with cardiology recommended outpatient follow up for repeat echo and if pulmonary pressure still elevated he will likely need a RHC for further evaluation. Symptoms could be from altitude sickness and pulmonary edema. Patient's vital signs are stable and he is requesting to be discharged home. Referral has been placed for outpatient cardiology follow up and he is to take Augmentin to finish his abx course. He is to return to the ER if his condition worsens.     Therefore, he is discharged in good and stable condition to home with close outpatient follow-up.    The patient met 2-midnight criteria for an inpatient stay at the time of discharge.    Discharge Date  9/13/2024    FOLLOW UP ITEMS POST DISCHARGE  Referral  placed for outpatient cardiology follow up to repeat echo     DISCHARGE DIAGNOSES  Principal Problem:    Acute respiratory failure with hypoxemia (HCC) (POA: Yes)  Active Problems:    Multifocal pneumonia (POA: Unknown)    Elevated brain natriuretic peptide (BNP) level (POA: Unknown)  Resolved Problems:    * No resolved hospital problems. *      FOLLOW UP  No future appointments.  [facility] CARDIOLOGY  [phone]  Follow up  Referral placed for outpatient cardiology to repeat echocardiogram.      MEDICATIONS ON DISCHARGE     Medication List        START taking these medications        Instructions   amoxicillin-clavulanate 875-125 MG Tabs  Commonly known as: Augmentin   Take 1 Tablet by mouth every 12 hours for 4 days.  Dose: 1 Tablet              Allergies  No Known Allergies    DIET  Orders Placed This Encounter   Procedures    Diet Order Diet: Regular     Standing Status:   Standing     Number of Occurrences:   1     Order Specific Question:   Diet:     Answer:   Regular [1]       ACTIVITY  As tolerated.  Weight bearing as tolerated    CONSULTATIONS  None     PROCEDURES  None     LABORATORY  Lab Results   Component Value Date    SODIUM 139 09/13/2024    POTASSIUM 4.0 09/13/2024    CHLORIDE 103 09/13/2024    CO2 26 09/13/2024    GLUCOSE 108 (H) 09/13/2024    BUN 22 09/13/2024    CREATININE 1.29 09/13/2024        Lab Results   Component Value Date    WBC 9.8 09/13/2024    HEMOGLOBIN 15.3 09/13/2024    HEMATOCRIT 44.9 09/13/2024    PLATELETCT 251 09/13/2024        Total time of the discharge process exceeds 50 minutes.

## 2024-09-14 PROBLEM — R93.1 ABNORMAL ECHOCARDIOGRAM: Status: ACTIVE | Noted: 2024-09-14

## 2024-09-14 LAB
BACTERIA BLD CULT: ABNORMAL
BACTERIA BLD CULT: ABNORMAL
SIGNIFICANT IND 70042: ABNORMAL
SITE SITE: ABNORMAL
SOURCE SOURCE: ABNORMAL

## 2024-09-14 NOTE — ASSESSMENT & PLAN NOTE
Suggestive but not diagnostic of pulmonary HTN  Pulmonary artery also dilated on CT   Recommendations: HIV, Hepatitis labs, UDS - all negative  Recommend outpatient RHC  If true pulmonary HTN on RHC, will need extensive workup for etiology including sleep study

## 2024-09-17 LAB
BACTERIA BLD CULT: NORMAL
SIGNIFICANT IND 70042: NORMAL
SITE SITE: NORMAL
SOURCE SOURCE: NORMAL

## 2024-09-19 ENCOUNTER — OFFICE VISIT (OUTPATIENT)
Dept: CARDIOLOGY | Facility: MEDICAL CENTER | Age: 34
End: 2024-09-19
Attending: INTERNAL MEDICINE
Payer: COMMERCIAL

## 2024-09-19 VITALS
RESPIRATION RATE: 16 BRPM | DIASTOLIC BLOOD PRESSURE: 60 MMHG | BODY MASS INDEX: 27.99 KG/M2 | HEART RATE: 60 BPM | HEIGHT: 69 IN | OXYGEN SATURATION: 97 % | WEIGHT: 189 LBS | SYSTOLIC BLOOD PRESSURE: 100 MMHG

## 2024-09-19 DIAGNOSIS — I27.20 PULMONARY HYPERTENSION (HCC): ICD-10-CM

## 2024-09-19 LAB — EKG IMPRESSION: NORMAL

## 2024-09-19 PROCEDURE — 3078F DIAST BP <80 MM HG: CPT | Performed by: INTERNAL MEDICINE

## 2024-09-19 PROCEDURE — 93010 ELECTROCARDIOGRAM REPORT: CPT | Performed by: INTERNAL MEDICINE

## 2024-09-19 PROCEDURE — 93005 ELECTROCARDIOGRAM TRACING: CPT | Performed by: INTERNAL MEDICINE

## 2024-09-19 PROCEDURE — 99204 OFFICE O/P NEW MOD 45 MIN: CPT | Performed by: INTERNAL MEDICINE

## 2024-09-19 PROCEDURE — 99211 OFF/OP EST MAY X REQ PHY/QHP: CPT | Performed by: INTERNAL MEDICINE

## 2024-09-19 PROCEDURE — 3074F SYST BP LT 130 MM HG: CPT | Performed by: INTERNAL MEDICINE

## 2024-09-19 ASSESSMENT — ENCOUNTER SYMPTOMS
BLOOD IN STOOL: 0
HEADACHES: 0
COUGH: 0
SHORTNESS OF BREATH: 0
SPEECH CHANGE: 0
BLURRED VISION: 0
DIZZINESS: 0
SENSORY CHANGE: 0
DEPRESSION: 0
CLAUDICATION: 0
CHILLS: 0
BRUISES/BLEEDS EASILY: 0
LOSS OF CONSCIOUSNESS: 0
FEVER: 0
VOMITING: 0
EYE PAIN: 0
EYE DISCHARGE: 0
PALPITATIONS: 0
NAUSEA: 0
PND: 0
FALLS: 0
DOUBLE VISION: 0
ORTHOPNEA: 0
HALLUCINATIONS: 0
ABDOMINAL PAIN: 0
MYALGIAS: 0
WEIGHT LOSS: 0

## 2024-09-19 ASSESSMENT — FIBROSIS 4 INDEX: FIB4 SCORE: 0.47

## 2024-09-19 NOTE — PROGRESS NOTES
Chief Complaint   Patient presents with    Pulmonary Hypertension       Subjective     Jamin Fagan is a 34 y.o. male who presents today for cardiac care and evaluation due to recent hospitalization due to elevation induced ARDS and evidence of pulmonary hypertension on transthoracic echocardiogram.  Patient was in the ICU.  He was in the room he is helping his friend to do some work.  [place]  He is feeling much better now.  No shortness of breath no chest pain.    I have personally interpreted EKG today with patient, there is no evidence of acute coronary syndrome, no evidence of prior infarct, normal CT and QT interval, no significant conduction disease. Sinus rhythm.    09/13/2024 I have independently interpreted and reviewed echocardiogram's actual images with patient which showed normal left ventricular systolic function. No wall motion abnormality. Elevated RVSP. No significant valvular disease.    No family history of sudden cardiac death.    No past medical history on file.  No past surgical history on file.  No family history on file.  Social History     Socioeconomic History    Marital status: Single     Spouse name: Not on file    Number of children: Not on file    Years of education: Not on file    Highest education level: Not on file   Occupational History    Not on file   Tobacco Use    Smoking status: Never    Smokeless tobacco: Never   Substance and Sexual Activity    Alcohol use: Yes     Alcohol/week: 3.0 oz     Types: 5 Cans of beer per week    Drug use: Not on file    Sexual activity: Not on file   Other Topics Concern    Not on file   Social History Narrative    Not on file     Social Determinants of Health     Financial Resource Strain: Not on file   Food Insecurity: Not on file   Transportation Needs: Not on file   Physical Activity: Not on file   Stress: Not on file   Social Connections: Not on file   Intimate Partner Violence: Not At Risk  "(9/11/2024)    Humiliation, Afraid, Rape, and Kick questionnaire     Fear of Current or Ex-Partner: No     Emotionally Abused: No     Physically Abused: No     Sexually Abused: No   Housing Stability: Not on file     No Known Allergies  No outpatient encounter medications on file as of 9/19/2024.     No facility-administered encounter medications on file as of 9/19/2024.     Review of Systems   Constitutional:  Negative for chills, fever, malaise/fatigue and weight loss.   HENT:  Negative for ear discharge, ear pain, hearing loss and nosebleeds.    Eyes:  Negative for blurred vision, double vision, pain and discharge.   Respiratory:  Negative for cough and shortness of breath.    Cardiovascular:  Negative for chest pain, palpitations, orthopnea, claudication, leg swelling and PND.   Gastrointestinal:  Negative for abdominal pain, blood in stool, melena, nausea and vomiting.   Genitourinary:  Negative for dysuria and hematuria.   Musculoskeletal:  Negative for falls, joint pain and myalgias.   Skin:  Negative for itching and rash.   Neurological:  Negative for dizziness, sensory change, speech change, loss of consciousness and headaches.   Endo/Heme/Allergies:  Negative for environmental allergies. Does not bruise/bleed easily.   Psychiatric/Behavioral:  Negative for depression, hallucinations and suicidal ideas.               Objective     /60 (BP Location: Left arm, Patient Position: Sitting, BP Cuff Size: Adult)   Pulse 60   Resp 16   Ht 1.753 m (5' 9\")   Wt 85.7 kg (189 lb)   SpO2 97%   BMI 27.91 kg/m²     Physical Exam  Vitals and nursing note reviewed.   Constitutional:       General: He is not in acute distress.     Appearance: He is not diaphoretic.   HENT:      Head: Normocephalic and atraumatic.      Right Ear: External ear normal.      Left Ear: External ear normal.      Nose: No congestion or rhinorrhea.   Eyes:      General:         Right eye: No discharge.         Left eye: No discharge. "   Neck:      Thyroid: No thyromegaly.      Vascular: No JVD.   Cardiovascular:      Rate and Rhythm: Normal rate and regular rhythm.      Pulses: Normal pulses.   Pulmonary:      Effort: No respiratory distress.   Abdominal:      General: There is no distension.      Tenderness: There is no abdominal tenderness.   Musculoskeletal:         General: No swelling or tenderness.      Right lower leg: No edema.      Left lower leg: No edema.   Skin:     General: Skin is warm and dry.   Neurological:      Mental Status: He is alert and oriented to person, place, and time.      Cranial Nerves: No cranial nerve deficit.   Psychiatric:         Behavior: Behavior normal.                Assessment & Plan     1. Pulmonary hypertension (HCC)  EKG    EC-ECHOCARDIOGRAM COMPLETE W/O CONT          Medical Decision Making: Today's Assessment/Status/Plan:   At this time I do think that we should repeat a transthoracic echocardiogram to further assess his RVSP.  Most likely that has decreased as patient is completely asymptomatic at this time.  If abnormal, will consider further invasive workup if not, no further invasive workup.    This visit encounter signifies the visit complexity inherent to evaluation and management associated with medical care services that serve as the continuing focal point for all needed health care services and/or with medical care services that are part of ongoing care related to this patient's single, serious condition, complex cardiac condition.    Penny Rothman M.D.

## 2024-09-20 ENCOUNTER — APPOINTMENT (OUTPATIENT)
Dept: CARDIOLOGY | Facility: MEDICAL CENTER | Age: 34
End: 2024-09-20
Attending: INTERNAL MEDICINE
Payer: COMMERCIAL

## 2024-09-24 ENCOUNTER — HOSPITAL ENCOUNTER (OUTPATIENT)
Dept: CARDIOLOGY | Facility: MEDICAL CENTER | Age: 34
End: 2024-09-24
Attending: INTERNAL MEDICINE
Payer: COMMERCIAL

## 2024-09-24 DIAGNOSIS — I27.20 PULMONARY HYPERTENSION (HCC): ICD-10-CM

## 2024-09-24 LAB
LV EJECT FRACT  99904: 61
LV EJECT FRACT MOD 2C 99903: 58.84
LV EJECT FRACT MOD 4C 99902: 63.78
LV EJECT FRACT MOD BP 99901: 61.26

## 2024-09-24 PROCEDURE — 93308 TTE F-UP OR LMTD: CPT | Mod: 26 | Performed by: INTERNAL MEDICINE

## 2024-09-24 PROCEDURE — 93321 DOPPLER ECHO F-UP/LMTD STD: CPT | Mod: 26 | Performed by: INTERNAL MEDICINE

## 2024-09-24 PROCEDURE — 93321 DOPPLER ECHO F-UP/LMTD STD: CPT

## 2024-09-24 PROCEDURE — 93325 DOPPLER ECHO COLOR FLOW MAPG: CPT | Mod: 26 | Performed by: INTERNAL MEDICINE
